# Patient Record
(demographics unavailable — no encounter records)

---

## 2024-12-05 NOTE — DISCUSSION/SUMMARY
[Bundle Branch Block] : ~T bundle branch block [Stable] : stable [Hypertension] : hypertension [Patient] : the patient [FreeTextEntry1] : Here today to follow up AVR  HTN: Continue current medications  HLD: Carotid US and recent lipid profile reviewed CW statin  Pt will follow up in 6months.  Echo to evaluate SAVR PMD note was reviewed.  [EKG obtained to assist in diagnosis and management of assessed problem(s)] : EKG obtained to assist in diagnosis and management of assessed problem(s)

## 2024-12-05 NOTE — HISTORY OF PRESENT ILLNESS
[FreeTextEntry1] : 67 yo male PMH: HTN, HLD, AS by Echo in 2018 here today in routine cardiac follow up after SAVR (bicuspid valve heavily calcified) 12/23 Pt is recovering well.  medications/testing reviewed. Home BP readings reveal variability without concordance with office readings. Pt feels well.

## 2024-12-05 NOTE — PHYSICAL EXAM
[Normal S1, S2] : normal S1, S2 [Normal] : clear lung fields, good air entry, no respiratory distress [Soft] : abdomen soft [Normal Gait] : normal gait [No Edema] : no edema [de-identified] : 3/6 sys murmur

## 2025-01-06 NOTE — HEALTH RISK ASSESSMENT
[0] : 2) Feeling down, depressed, or hopeless: Not at all (0) [PHQ-2 Negative - No further assessment needed] : PHQ-2 Negative - No further assessment needed [[id number]] : 0

## 2025-01-06 NOTE — HISTORY OF PRESENT ILLNESS
[Hyperlipidemia] : Hyperlipidemia [Hypertension] : Hypertension [FreeTextEntry6] : Pt is here for BP check and fasting blood work. [No episodes] : No hypoglycemic episodes since the last visit. [Understanding of foot care] : Patient expressed understanding of foot care [No Retinopathy] : No retinopathy [Most Recent A1C: ___] : Most recent A1C was [unfilled] [EyeExamDate] : 11/24 [Does not check BP] : The patient is not checking blood pressure [Target goal met] : BP target goal met [Doing Well] : Patient is doing well [Managed with medications] : managed with  medication [Moderate Intensity Therapy] : Patient is currently on moderate intensity statin  therapy

## 2025-01-09 NOTE — PLAN
[FreeTextEntry1] : The patient has been advised to remain NPO after the midright prior to surgery. Risks and benefits of the surgery have been discussed by the operative physician. The patient has been advised to avoid taking aspirin and aspirin containing products from now until surgery. The patient has been advised to take the metoprolol, amiodarone and lisinopril on the morning of surgery with small sip of water. Thank you for including me in the care of your patient.

## 2025-01-09 NOTE — CONSULT LETTER
[Dear  ___] : Dear  [unfilled], [( Thank you for referring [unfilled] for consultation for _____ )] : Thank you for referring [unfilled] for consultation for [unfilled] [Please see my note below.] : Please see my note below. [Consult Closing:] : Thank you very much for allowing me to participate in the care of this patient.  If you have any questions, please do not hesitate to contact me. [Sincerely,] : Sincerely, [FreeTextEntry3] : Jaziel Green MD, FAAFP Chair, Family Medicine, [facility] , Family Medicine, [facility], [facility] , Family Medicine, [employer] , Family and Community Medicine, [facility]

## 2025-01-09 NOTE — HISTORY OF PRESENT ILLNESS
[No Pertinent Pulmonary History] : no history of asthma, COPD, sleep apnea, or smoking [No Adverse Anesthesia Reaction] : no adverse anesthesia reaction in self or family member [Diabetes] : diabetes [(Patient denies any chest pain, claudication, dyspnea on exertion, orthopnea, palpitations or syncope)] : Patient denies any chest pain, claudication, dyspnea on exertion, orthopnea, palpitations or syncope [Moderate (4-6 METs)] : Moderate (4-6 METs) [Anti-Platelet Agents: _____] : Anti-Platelet Agents: [unfilled] [NSAIDs: _____] : NSAIDs: [unfilled] [Herbal Supplements: _____] : Herbal Supplements: [unfilled] [Aortic Stenosis] : no aortic stenosis [Atrial Fibrillation] : no atrial fibrillation [Coronary Artery Disease] : no coronary artery disease [Recent Myocardial Infarction] : no recent myocardial infarction [Implantable Device/Pacemaker] : no implantable device/pacemaker [Chronic Anticoagulation] : no chronic anticoagulation [Chronic Kidney Disease] : no chronic kidney disease [FreeTextEntry1] : Cataract [FreeTextEntry2] : 01/14/2025 and 1/29/25 at the [facility] [place] [FreeTextEntry3] : Bladimir Urrutia MD [FreeTextEntry4] : pt is here for medical clearance [FreeTextEntry5] : Aortic Valve Replacement

## 2025-03-03 NOTE — PHYSICAL EXAM
[Normal S1, S2] : normal S1, S2 [Normal] : clear lung fields, good air entry, no respiratory distress [Soft] : abdomen soft [Normal Gait] : normal gait [No Edema] : no edema [de-identified] : 3/6 sys murmur

## 2025-03-03 NOTE — HISTORY OF PRESENT ILLNESS
[FreeTextEntry1] : 69 yo male PMH: HTN, HLD, AS by Echo in 2018 here today in routine cardiac follow up after SAVR (bicuspid valve heavily calcified) 12/23 Pt is recovering well.  medications/testing reviewed. Home BP readings reveal variability without concordance with office readings. Pt feels well. Pt did not bring in cuff today. He did not take his medications yet today.

## 2025-03-03 NOTE — DISCUSSION/SUMMARY
[Bundle Branch Block] : ~T bundle branch block [Stable] : stable [Hypertension] : hypertension [Patient] : the patient [FreeTextEntry1] : Here today to follow up AVR and HTN  HTN: Continue current medications. Lisinopril and metoprolol q 12 hours  HLD: Carotid US and recent lipid profile reviewed CW statin  Pt will follow up in 2 months with BP check Echo to evaluate SAVR PMD note was reviewed.  [EKG obtained to assist in diagnosis and management of assessed problem(s)] : EKG obtained to assist in diagnosis and management of assessed problem(s)

## 2025-05-19 NOTE — DISCUSSION/SUMMARY
[Bundle Branch Block] : ~T bundle branch block [Stable] : stable [Hypertension] : hypertension [Patient] : the patient [FreeTextEntry1] : Here today to follow up AVR and HTN  HTN: Continue current medications. Lisinopril and metoprolol q 12 hours  HLD: Carotid US and recent lipid profile reviewed CW statin  Follow up in 6 months Echo to evaluate SAVR PMD note was reviewed.  [EKG obtained to assist in diagnosis and management of assessed problem(s)] : EKG obtained to assist in diagnosis and management of assessed problem(s)

## 2025-05-19 NOTE — HISTORY OF PRESENT ILLNESS
[FreeTextEntry1] : 68 yo male PMH: HTN, HLD, AS by Echo in 2018 here today in routine cardiac follow up after SAVR (bicuspid valve heavily calcified) 12/23 Labs were reviewed. Echo and CT surgery reports were reviewed. He is exercising with weights and walking.  medications/testing reviewed. Home BP readings reveal variability without concordance with office readings. Pt feels well. Pt brought his BP cuff in today which correlates with office today

## 2025-05-19 NOTE — PHYSICAL EXAM
[Normal S1, S2] : normal S1, S2 [Normal] : clear lung fields, good air entry, no respiratory distress [Soft] : abdomen soft [Normal Gait] : normal gait [No Edema] : no edema [de-identified] : 3/6 sys murmur